# Patient Record
Sex: MALE | Race: WHITE | NOT HISPANIC OR LATINO | Employment: FULL TIME | URBAN - METROPOLITAN AREA
[De-identification: names, ages, dates, MRNs, and addresses within clinical notes are randomized per-mention and may not be internally consistent; named-entity substitution may affect disease eponyms.]

---

## 2019-04-25 ENCOUNTER — HOSPITAL ENCOUNTER (OUTPATIENT)
Dept: GENERAL RADIOLOGY | Facility: HOSPITAL | Age: 54
Discharge: HOME | End: 2019-04-25
Admitting: FAMILY MEDICINE

## 2019-04-25 ENCOUNTER — TRANSCRIBE ORDERS (OUTPATIENT)
Dept: ADMINISTRATIVE | Facility: HOSPITAL | Age: 54
End: 2019-04-25

## 2019-04-25 DIAGNOSIS — M50.90 CERVICAL DISC DISEASE: Primary | ICD-10-CM

## 2019-04-25 PROCEDURE — 72050 X-RAY EXAM NECK SPINE 4/5VWS: CPT

## 2019-04-30 ENCOUNTER — TRANSCRIBE ORDERS (OUTPATIENT)
Dept: ADMINISTRATIVE | Facility: HOSPITAL | Age: 54
End: 2019-04-30

## 2019-04-30 DIAGNOSIS — M50.00 CERVICAL DISC DISEASE WITH MYELOPATHY: Primary | ICD-10-CM

## 2019-05-29 DIAGNOSIS — M50.00 CERVICAL DISC DISEASE WITH MYELOPATHY: ICD-10-CM

## 2019-06-18 ENCOUNTER — OFFICE VISIT (OUTPATIENT)
Dept: NEUROSURGERY | Facility: CLINIC | Age: 54
End: 2019-06-18

## 2019-06-18 VITALS
TEMPERATURE: 98.2 F | SYSTOLIC BLOOD PRESSURE: 130 MMHG | WEIGHT: 209 LBS | DIASTOLIC BLOOD PRESSURE: 78 MMHG | BODY MASS INDEX: 29.26 KG/M2 | HEIGHT: 71 IN

## 2019-06-18 DIAGNOSIS — M47.22 OSTEOARTHRITIS OF SPINE WITH RADICULOPATHY, CERVICAL REGION: Primary | ICD-10-CM

## 2019-06-18 PROCEDURE — 99203 OFFICE O/P NEW LOW 30 MIN: CPT | Performed by: PHYSICIAN ASSISTANT

## 2019-06-18 RX ORDER — DICYCLOMINE HYDROCHLORIDE 10 MG/1
CAPSULE ORAL
COMMUNITY
Start: 2019-05-31 | End: 2019-07-05

## 2019-06-18 NOTE — PROGRESS NOTES
Patient: Yamil Barrios  : 1965  Chart #: [id number]    Date of Service: 2019    CHIEF COMPLAINT: Neck and right arm pain with sensory alteration    History of Present Illness Mr. 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  He describes a 5 year history of pain and some degree of weakness in the right shoulder.  He was reportedly told it was due to his biceps tendon dislocating when his abducts his arm.  He's had intermittent neck pain.  Over the past 5 months or so symptoms have progressed. He has worsening neck pain that extends into the scapula and down the arm to the index finger and thumb.  These fingers are numb all the time.  His arm frequently feels like it is asleep.  He has generalized weakness.  His shoulder hurts more.  Symptoms are worse when leaning forward and with activity. He has been doing range of motion and strengthening exercises at home to no avail.  No left sided symptoms.     Patient had a lumbar discectomy 12 years ago at [facility] and did well.     History reviewed. No pertinent past medical history.      Current Outpatient Medications:   •  dicyclomine (BENTYL) 10 MG capsule, , Disp: , Rfl:     Past Surgical History:   Procedure Laterality Date   • HAND SURGERY Bilateral    • LIPOMA EXCISION     • LUMBAR DISCECTOMY      Dr. Dorman level unknown   • NASAL SEPTUM SURGERY         Social History     Socioeconomic History   • Marital status:      Spouse name: Not on file   • Number of children: Not on file   • Years of education: Not on file   • Highest education level: Not on file   Tobacco Use   • Smoking status: Former Smoker   • Smokeless tobacco: Never Used   Substance and Sexual Activity   • Alcohol use: No     Frequency: Never   • Drug use: No   • Sexual activity: Defer         Review of Systems   Constitutional: Positive for fatigue. Negative for activity change, appetite change, chills, diaphoresis, fever and  unexpected weight change.   HENT: Negative for congestion, dental problem, drooling, ear discharge, ear pain, facial swelling, hearing loss, mouth sores, nosebleeds, postnasal drip, rhinorrhea, sinus pressure, sneezing, sore throat, tinnitus, trouble swallowing and voice change.    Eyes: Positive for itching. Negative for photophobia, pain, discharge, redness and visual disturbance.   Respiratory: Negative for apnea, cough, choking, chest tightness, shortness of breath, wheezing and stridor.    Cardiovascular: Negative for chest pain, palpitations and leg swelling.   Gastrointestinal: Negative for abdominal distention, abdominal pain, anal bleeding, blood in stool, constipation, diarrhea, nausea, rectal pain and vomiting.   Endocrine: Negative for cold intolerance, heat intolerance, polydipsia, polyphagia and polyuria.   Genitourinary: Negative for decreased urine volume, difficulty urinating, dysuria, enuresis, flank pain, frequency, genital sores, hematuria and urgency.   Musculoskeletal: Positive for arthralgias, back pain, myalgias, neck pain and neck stiffness. Negative for gait problem and joint swelling.   Skin: Negative for color change, pallor, rash and wound.   Allergic/Immunologic: Positive for environmental allergies. Negative for food allergies and immunocompromised state.   Neurological: Positive for weakness, numbness and headaches. Negative for dizziness, tremors, seizures, syncope, facial asymmetry, speech difficulty and light-headedness.   Hematological: Negative for adenopathy. Does not bruise/bleed easily.   Psychiatric/Behavioral: Positive for sleep disturbance. Negative for agitation, behavioral problems, confusion, decreased concentration, dysphoric mood, hallucinations, self-injury and suicidal ideas. The patient is not nervous/anxious and is not hyperactive.        Objective   Vital Signs: Blood pressure 130/78, temperature 98.2 °F (36.8 °C), temperature source Temporal, height 180.3 cm  "(71\"), weight 94.8 kg (209 lb).  Physical Exam   Constitutional: He appears well-developed and well-nourished. No distress.   HENT:   Head: Normocephalic and atraumatic.   Eyes: EOM are normal. Pupils are equal, round, and reactive to light.   Cardiovascular: Normal rate and regular rhythm.   Pulmonary/Chest: Effort normal and breath sounds normal.   Psychiatric: He has a normal mood and affect. His behavior is normal. Thought content normal.   Nursing note and vitals reviewed.  Musculoskeletal: No shoulder tenderness. No biceps tendon tenderness.   Strength is intact in upper and lower extremities to direct testing.  Station and gait are normal.  Neurologic:  Muscle tone is normal throughout.  Coordination is intact.  Deep tendon reflexes: 1+ and symmetrical.  Sensation is intact to light touch throughout.  Patient is oriented to person, place, and time.  Yee sign negative.     Independent review of radiographic imaging: PLain films of the cervical spine demonstate normal alignment. There is disc space narrowing particularly at C6-7.     Assessment/Plan    Diagnosis: Neck and right arm pain concerning for C6 radiculopathy     Medical Decision Making: I referred patient for some formal therapy with traction.  He will follow up in about 3 weeks with an MRI of the cervical spine for review and further recommendation.  He is not interested in medicines to treat his symptoms at this time.           Yamil was seen today for neck pain.    Diagnoses and all orders for this visit:    Osteoarthritis of spine with radiculopathy, cervical region  -     MRI Cervical Spine Without Contrast; Future  -     Ambulatory Referral to Physical Therapy (Cervical traction )               Lauren Garvey PA-C  Patient Care Team:  Bryson Perez MD as PCP - General (Family Medicine)  Bryson Perez MD as Referring Physician (Family Medicine)              "

## 2019-06-25 ENCOUNTER — HOSPITAL ENCOUNTER (OUTPATIENT)
Dept: PHYSICAL THERAPY | Facility: HOSPITAL | Age: 54
Setting detail: THERAPIES SERIES
Discharge: HOME | End: 2019-06-25

## 2019-06-25 DIAGNOSIS — M47.22 OSTEOARTHRITIS OF SPINE WITH RADICULOPATHY, CERVICAL REGION: ICD-10-CM

## 2019-06-25 DIAGNOSIS — M54.12 CERVICAL RADICULOPATHY: Primary | ICD-10-CM

## 2019-06-25 PROCEDURE — 97161 PT EVAL LOW COMPLEX 20 MIN: CPT

## 2019-06-25 NOTE — THERAPY EVALUATION
Outpatient Physical Therapy Ortho Initial Evaluation  [facility]     Patient Name: Yamil Barrios  : 1965  MRN: [id number]  Today's Date: 2019      Visit Date: 2019    There is no problem list on file for this patient.       No past medical history on file.     Past Surgical History:   Procedure Laterality Date   • HAND SURGERY Bilateral    • LIPOMA EXCISION     • LUMBAR DISCECTOMY      Dr. Dorman level unknown   • NASAL SEPTUM SURGERY         Visit Dx:     ICD-10-CM ICD-9-CM   1. Cervical radiculopathy M54.12 723.4         Patient History     Row Name 19 1515             History    Chief Complaint  Numbness;Pain;Tinglings  -LF      Type of Pain  Neck pain;Upper Extremity / Arm right  -LF      Date Current Problem(s) Began  -- 4-5 months ago  -      Brief Description of Current Complaint  Patient presents with right neck and UE pain with numbness and tingling into fingers.  Symptoms began 4-5 months ago, and worsened since onset.  He reports constant numbness in 1st and 2nd fingers, starting to occur in 3rd finger.  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  He was treated several years ago for RTC problem.  He states symptoms improved, but feels may be related to current symptoms.  He also complains of pain and tenderness at his elbow.  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  has continued exercises from last episode P.T.  -LF      What clinical tests have you had for this problem?  X-ray MRI scheduled 7-  -LF      Results of Clinical Tests  degenerative changes  -LF         Pain     Pain Location  Neck  -LF      Pain Frequency  Constant/continuous  -LF      Pain Description  Pins and needles;Tingling;Sharp;Radiating  -LF      What Performance Factors Make the Current  Problem(s) WORSE?  leaning head forward, or resting forward when sitting  -LF      What Performance Factors Make the Current Problem(s) BETTER?  sitting up tall, laying flat (but can't sleep this way)  -LF      Pain Comments  prefers to sleep on R side, but forced to sleep on L due to symptoms but does not sleep well.  Sometimes can position on R side with R arm extended under his head  -LF      Is your sleep disturbed?  Yes  -LF      What position do you sleep in?  Left sidelying;Other (comment)  -LF         Fall Risk Assessment    Any falls in the past year:  Unspecified  -LF         Daily Activities    Primary Language  English  -      How does patient learn best?  Listening;Demonstration  -      Teaching needs identified  Home Exercise Program;Management of Condition  -LF      Barriers to learning  None  -LF      Pt Participated in POC and Goals  Yes  -LF        User Key  (r) = Recorded By, (t) = Taken By, (c) = Cosigned By    Initials Name Provider Type     Isha Patterson PT Physical Therapist          PT Ortho     Row Name 06/25/19 7798       Posture/Observations    Alignment Options  Forward head;Rounded shoulders  -LF    Forward Head  Bilateral:;Moderate  -LF    Rounded Shoulders  Bilateral:;Moderate  -LF    Posture/Observations Comments  slouched posture in sitting  -LF       Quarter Clearing    Quarter Clearing  Upper Quarter Clearing  -LF       Sensory Screen for Light Touch- Upper Quarter Clearing    C4 (posterior shoulder)  Bilateral:;Intact  -LF    C5 (lateral upper arm)  Bilateral:;Intact  -LF    C6 (tip of thumb)  Right:;Diminished;Left:;Intact  -LF    C7 (tip of 3rd finger)  Right:;Diminished;Left:;Intact  -LF    C8 (tip of 5th finger)  Bilateral:;Intact  -LF    T1 (medial lower arm)  Bilateral:;Intact  -LF       Myotomal Screen- Upper Quarter Clearing    Shoulder flexion (C5)  Bilateral:;WNL  -LF    Elbow flexion/wrist extension (C6)  Bilateral:;WNL  -LF    Elbow extension/wrist flexion  (C7)  Bilateral:;WNL  -LF    Finger flexion/ (C8)  Bilateral:;WNL  -LF    Finger abduction (T1)  Bilateral:;WNL  -LF       Special Tests/Palpation    Special Tests/Palpation  Cervical/Thoracic;Elbow/Forearm  -LF       Cervical Palpation    Cervical Palpation- Location?  Upper traps;Levator scapula  -LF    Levator Scapula  Right:;Tender;Guarded/taut  -LF    Upper Traps  Right:;Guarded/taut;Tender  -LF       Cervical/Thoracic Special Tests    Spurlings (Foraminal Compression)  Right:;Positive;Left:;Negative  -LF    Cervical Compression (Forarminal Compression vs. Facet Pain)  Right:;Positive  -LF    Cervical Distraction (Foraminal Compression vs. Facet Pain)  Right:;Positive manual distraction in supine inc'd symptoms  -LF       Elbow/Forearm Special Tests    Lateral Epicondyle Test (Tennis Elbow)  Right:;Positive wrist and 3rd finger ext, flex stretch  -LF       Elbow/Forearm Palpation    Elbow/Forearm Palpation?  Yes  -LF    Lateral Epicondyle  Right:;Tender  -LF    Extensor Tendon  Right:;Tender  -LF       General ROM    Head/Neck/Trunk  Neck Extension;Neck Flexion;Neck Lt Lateral Flexion;Neck Rt Lateral Flexion;Neck Lt Rotation;Neck Rt Rotation  -LF    GENERAL ROM COMMENTS  UE ROM is WNL's  -LF       Head/Neck/Trunk    Neck Extension AROM  45  -LF    Neck Flexion AROM  35  -LF    Neck Lt Lateral Flexion AROM  36  -LF    Neck Rt Lateral Flexion AROM  37  -LF    Neck Lt Rotation AROM  80  -LF    Neck Rt Rotation AROM  78  -LF    Head/Neck/Trunk Comments  increased lower c-spine pain with flex; increased RUE radicular symptoms R rotation, scapular pain with R SB  -LF       MMT (Manual Muscle Testing)    General MMT Comments   strength 112lb bilaterally  -LF      User Key  (r) = Recorded By, (t) = Taken By, (c) = Cosigned By    Initials Name Provider Type    Isha Eduardo PT Physical Therapist                      Therapy Education  Education Details: cervical retraction, and cervical retraction w/  extension.  He has been doing blade squeezes and shoulder circles at home, some stretches  Given: HEP  Program: New  How Provided: Verbal, Demonstration, Written  Provided to: Patient  Level of Understanding: Teach back education performed     PT OP Goals     Row Name 06/25/19 1515          PT Short Term Goals    STG Date to Achieve  07/16/19  -LF     STG 1  Patient to report 50% improvement in radicular symptoms.  -LF     STG 1 Progress  New  -LF     STG 2  Patient independent and compliant with HEP for management of symptoms  -LF     STG 2 Progress  New  -LF        Long Term Goals    LTG Date to Achieve  08/06/19  -LF     LTG 1  Patient to report RUE symptoms resolved  -LF     LTG 1 Progress  New  -LF     LTG 2  Patient to report R-sided pain no greater than 2/10 with daily activities  -LF     LTG 2 Progress  New  -LF     LTG 3  NDI score improved by 10%  -LF     LTG 4  Cervical ROM WNL's and without increased symptoms.  -LF     LTG 4 Progress  New  -LF        Time Calculation    PT Goal Re-Cert Due Date  09/23/19  -LF       User Key  (r) = Recorded By, (t) = Taken By, (c) = Cosigned By    Initials Name Provider Type    LF Isha Patterson, PT Physical Therapist          PT Assessment/Plan     Row Name 06/25/19 1515          PT Assessment    Functional Limitations  Limitation in home management;Performance in leisure activities;Performance in self-care ADL;Performance in work activities  -LF     Impairments  Impaired flexibility;Muscle strength;Pain;Posture;Range of motion;Sensation  -LF     Assessment Comments  Patient presents with signs and symptoms consistent with cervical radiculopathy.  Strength intact, but he has numbness and tingling 1-3 digits. He has increased symtoms with flex and right rotation, improved with extension.  Also noted to have decrease symptoms with distraction while sitting, but increased when tested in supine.  He also has signs and symptoms indicative of lateral epicondylitis.  Will  benefit from continued treatment to decrease pain and tightness, and improve ROM to allow for maximal funciton.  -LF     Please refer to paper survey for additional self-reported information  Yes  -LF     Rehab Potential  Good  -LF     Patient/caregiver participated in establishment of treatment plan and goals  Yes  -LF     Patient would benefit from skilled therapy intervention  Yes  -LF        PT Plan    PT Frequency  2x/week  -LF     Predicted Duration of Therapy Intervention (Therapy Eval)  8-12 visits  -LF     Planned CPT's?  PT EVAL LOW COMPLEXITY: 44869;PT RE-EVAL: 21195;PT THER PROC EA 15 MIN: 08116;PT MANUAL THERAPY EA 15 MIN: 25742;PT ELECTRICAL STIM UNATTEND: ;PT ELECTRICAL STIM ATTD EA 15 MIN: 57501;PT ULTRASOUND EA 15 MIN: 73815;PT TRACTION CERVICAL: 37918;PT HOT/COLD PACK WC NONMCARE: 50023;PT IONTOPHORESIS EA 15 MIN: 06639  -LF     PT Plan Comments  cont. per POC with extension based exercises, manual therapy to address muscle tightness, manual vs. mechanical traction.  Also include modalities prn to help with pain  -LF       User Key  (r) = Recorded By, (t) = Taken By, (c) = Cosigned By    Initials Name Provider Type    Isha Eduardo PT Physical Therapist            Outcome Measure Options: Neck Disability Index (NDI)  Neck Disability Index  Section 1 - Pain Intensity: The pain is moderate at the moment.  Section 2 - Personal Care: I can look after myself normally without causing extra pain.  Section 3 - Lifting: I can lift heavy weights, but it gives me extra pain.  Section 4 - Work: I can only do my usual work, but no more  Section 5 - Headaches: I have moderate headaches that come infrequently.  Section 6 - Concentration: I can concentrate fully with slight difficulty.  Section 7 - Sleeping: My sleep is greatly disturbed for up to 3-5 hours.  Section 8 - Driving: I can drive as long as I want with moderate neck pain.  Section 9 - Reading: I can read as much as I want with moderate  neck pain.  Section 10 - Recreation: I have some neck pain with a few recreational activities.  Neck Disability Index Score: 17      Time Calculation:     Start Time: 1515     Therapy Charges for Today     Code Description Service Date Service Provider Modifiers Qty    59319510669 HC PT EVAL LOW COMPLEXITY 4 6/25/2019 Isha Patterson, PT GP 1          PT G-Codes  Outcome Measure Options: Neck Disability Index (NDI)  Neck Disability Index Score: 17         Isha Patterson, PT  6/25/2019

## 2019-07-05 ENCOUNTER — HOSPITAL ENCOUNTER (OUTPATIENT)
Dept: MRI IMAGING | Facility: HOSPITAL | Age: 54
Discharge: HOME | End: 2019-07-05
Admitting: PHYSICIAN ASSISTANT

## 2019-07-05 ENCOUNTER — HOSPITAL ENCOUNTER (OUTPATIENT)
Dept: PHYSICAL THERAPY | Facility: HOSPITAL | Age: 54
Setting detail: THERAPIES SERIES
Discharge: HOME | End: 2019-07-05

## 2019-07-05 ENCOUNTER — OFFICE VISIT (OUTPATIENT)
Dept: NEUROSURGERY | Facility: CLINIC | Age: 54
End: 2019-07-05

## 2019-07-05 VITALS — WEIGHT: 205 LBS | TEMPERATURE: 98.5 F | BODY MASS INDEX: 28.7 KG/M2 | HEIGHT: 71 IN

## 2019-07-05 DIAGNOSIS — M47.22 CERVICAL SPONDYLOSIS WITH RADICULOPATHY: Primary | ICD-10-CM

## 2019-07-05 DIAGNOSIS — M47.22 OSTEOARTHRITIS OF SPINE WITH RADICULOPATHY, CERVICAL REGION: ICD-10-CM

## 2019-07-05 DIAGNOSIS — M50.30 DDD (DEGENERATIVE DISC DISEASE), CERVICAL: ICD-10-CM

## 2019-07-05 PROCEDURE — 97140 MANUAL THERAPY 1/> REGIONS: CPT | Performed by: PHYSICAL THERAPIST

## 2019-07-05 PROCEDURE — 97110 THERAPEUTIC EXERCISES: CPT | Performed by: PHYSICAL THERAPIST

## 2019-07-05 PROCEDURE — 99213 OFFICE O/P EST LOW 20 MIN: CPT | Performed by: NEUROLOGICAL SURGERY

## 2019-07-05 PROCEDURE — 72141 MRI NECK SPINE W/O DYE: CPT

## 2019-07-05 NOTE — THERAPY TREATMENT NOTE
Outpatient Physical Therapy Ortho Treatment Note  [facility]     Patient Name: Yamil Barrios  : 1965  MRN: [id number]  Today's Date: 2019      Visit Date: 2019    Visit Dx:  No diagnosis found.    There is no problem list on file for this patient.       No past medical history on file.     Past Surgical History:   Procedure Laterality Date   • HAND SURGERY Bilateral    • LIPOMA EXCISION     • LUMBAR DISCECTOMY      Dr. Dorman level unknown   • NASAL SEPTUM SURGERY                         PT Assessment/Plan     Row Name 19 1524          PT Assessment    Assessment Comments  Client today demonstrates intermittent response with use of lateral component of repeated motions.  He will trial right roation HEP and follow up re: response.   Response in session today was limited however will continue to address carryover in determining POC  -CR        PT Plan    PT Plan Comments  Follow up on right rotation work re: carryover of relief  -CR       User Key  (r) = Recorded By, (t) = Taken By, (c) = Cosigned By    Initials Name Provider Type    Haris Hinojosa, PT Physical Therapist            Exercises     Row Name 19 1500             Subjective Comments    Subjective Comments  Client reports ongoing numbness in index and thumb.  Client reports work schedule has signficantly limited ability to attend therapy sessions.    -CR         Subjective Pain    Able to rate subjective pain?  yes  -CR      Pre-Treatment Pain Level  2  -CR         Total Minutes    92028 - PT Therapeutic Exercise Minutes  15  -CR      62895 - PT Manual Therapy Minutes  15  -CR         Exercise 1    Exercise Name 1  Seated retractions  -CR      Sets 1  3  -CR      Reps 1  10  -CR         Exercise 2    Exercise Name 2  Seated retraction/extension  -CR      Sets 2  3  -CR      Reps 2  10  -CR         Exercise 3    Exercise Name 3  Right rotation with OP  -CR      Sets 3  3  -CR      Reps 3  10  -CR        User Key   (r) = Recorded By, (t) = Taken By, (c) = Cosigned By    Initials Name Provider Type    Haris Hinojosa PT Physical Therapist                      Manual Rx (last 36 hours)      Manual Treatments     Row Name 07/05/19 1500             Total Minutes    26653 - PT Manual Therapy Minutes  15  -CR         Manual Rx 1    Manual Rx 1 Location  manual traction  -CR         Manual Rx 2    Manual Rx 2 Location  right sidebending  -CR         Manual Rx 3    Manual Rx 3 Location  retraction + erxtnesion  -CR        User Key  (r) = Recorded By, (t) = Taken By, (c) = Cosigned By    Initials Name Provider Type    Haris Hinojosa PT Physical Therapist              Therapy Education  Education Details: discussed right rotation/sidebend use for lateral HEP.                Time Calculation:   Start Time: 1440  Therapy Charges for Today     Code Description Service Date Service Provider Modifiers Qty    49816726097  PT THER PROC EA 15 MIN 7/5/2019 Haris Gonzalez, PT GP 1    [id number]  PT MANUAL THERAPY EA 15 MIN 7/5/2019 Hrais Gonzalez, PT GP 1                    Haris Gonzalez PT  7/5/2019

## 2019-07-05 NOTE — PROGRESS NOTES
Patient: Yamil Barrios  : 1965    Primary Care Provider: Bryson Perez MD    Requesting Provider: As above        History    Chief Complaint: Neck and right arm pain with sensory alteration.    History of Present Illness: Mr. 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  He describes a 5 year history of pain and some degree of weakness in the right shoulder.  He was reportedly told it was due to his biceps tendon dislocating when his abducts his arm.  He's had intermittent neck pain.  Over the past 5 months or so symptoms have progressed. He has worsening neck pain that extends into the scapula and down the arm to the index finger and thumb.  These fingers are numb all the time.  His arm frequently feels like it is asleep.  He has generalized weakness.  His shoulder hurts more.  Symptoms are worse when leaning forward and with activity. He has been doing range of motion and strengthening exercises at home to no avail.  No left sided symptoms.  He has just been evaluated by therapy.  His schedule right now before school resumes is very hectic and entails a great many hours.      Review of Systems   Constitutional: Positive for fatigue. Negative for activity change, appetite change, chills, diaphoresis, fever and unexpected weight change.   HENT: Positive for rhinorrhea, sinus pressure and sinus pain. Negative for congestion, dental problem, drooling, ear discharge, ear pain, facial swelling, hearing loss, mouth sores, nosebleeds, postnasal drip, sneezing, sore throat, tinnitus, trouble swallowing and voice change.    Eyes: Positive for redness and itching. Negative for photophobia, pain, discharge and visual disturbance.   Respiratory: Negative for apnea, cough, choking, chest tightness, shortness of breath, wheezing and stridor.    Cardiovascular: Negative for chest pain, palpitations and leg swelling.   Gastrointestinal: Negative for abdominal  "distention, abdominal pain, anal bleeding, blood in stool, constipation, diarrhea, nausea, rectal pain and vomiting.   Endocrine: Negative for cold intolerance, heat intolerance, polydipsia, polyphagia and polyuria.   Genitourinary: Negative for decreased urine volume, difficulty urinating, discharge, dysuria, enuresis, flank pain, frequency, genital sores, hematuria, penile pain, penile swelling, scrotal swelling, testicular pain and urgency.   Musculoskeletal: Positive for arthralgias, back pain, myalgias, neck pain and neck stiffness. Negative for gait problem and joint swelling.   Skin: Negative for color change, pallor, rash and wound.   Allergic/Immunologic: Positive for environmental allergies. Negative for food allergies and immunocompromised state.   Neurological: Positive for dizziness, weakness and headaches. Negative for tremors, seizures, syncope, facial asymmetry, speech difficulty, light-headedness and numbness.   Hematological: Negative for adenopathy. Does not bruise/bleed easily.   Psychiatric/Behavioral: Negative for agitation, behavioral problems, confusion, decreased concentration, dysphoric mood, hallucinations, self-injury, sleep disturbance and suicidal ideas. The patient is not nervous/anxious and is not hyperactive.        The patient's past medical history, past surgical history, family history, and social history have been reviewed at length in the electronic medical record.    Physical Exam:   Temp 98.5 °F (36.9 °C) (Temporal)   Ht 180.3 cm (71\")   Wt 93 kg (205 lb)   BMI 28.59 kg/m²   MUSCULOSKELETAL:  Neck tenderness to palpation is not observed.   ROM in neck is normal.  Ranging the right shoulder is well-tolerated without significant pain or limitation  NEUROLOGICAL:  Strength is intact in the upper and lower extremities to direct testing.  Muscle tone is normal throughout.  Station and gait are normal.  Sensation is intact to light touch testing throughout.  Deep tendon reflexes " are 1+ and symmetrical.  Cuco's Sign is negative bilaterally.       Medical Decision Making    Data Review:   MRI of the cervical spine demonstrates some disc-osteophyte very prominent rightward at C6-7 and to a lesser extent bilaterally at C5-6.    Diagnosis:   Cervical spondylosis with right upper extremity radiculopathy.    Treatment Options:   I discussed treatment options with the patient including physical therapy and surgical intervention.  I have explained the nature of the surgery as well as the potential risks, complications, and limitations.  He is going to try and get what therapy in that he can given his work schedule.  I prescribed a home traction unit so that at least we can do some home traction before he follows up after school starts.  His schedule should lessen at that point.  If he continues to struggle despite therapy then C5-7 ACDF will be recommended.       Diagnosis Plan   1. Cervical spondylosis with radiculopathy  Ambulatory Referral to Physical Therapy Evaluate and treat, Neuro    Cervical Traction Kit   2. DDD (degenerative disc disease), cervical             I, Dr. Landaverde, personally performed the services described in the documentation, as scribed in my presence, and it is both accurate and complete.  Scribed for Sanjay Landaverde MD by Parth Valdes CMA on 07/05/2019 at 12:27 PM

## 2019-07-09 ENCOUNTER — HOSPITAL ENCOUNTER (OUTPATIENT)
Dept: PHYSICAL THERAPY | Facility: HOSPITAL | Age: 54
Setting detail: THERAPIES SERIES
Discharge: HOME | End: 2019-07-09

## 2019-07-09 DIAGNOSIS — M54.12 CERVICAL RADICULOPATHY: Primary | ICD-10-CM

## 2019-07-09 DIAGNOSIS — M47.22 OSTEOARTHRITIS OF SPINE WITH RADICULOPATHY, CERVICAL REGION: ICD-10-CM

## 2019-07-09 NOTE — THERAPY DISCHARGE NOTE
Outpatient Physical Therapy Discharge Summary  [facility]       Patient Name: Yamil Barrios  : 1965  MRN: [id number]    Today's Date: 2019    Visit Dx:    ICD-10-CM ICD-9-CM   1. Cervical radiculopathy M54.12 723.4   2. Osteoarthritis of spine with radiculopathy, cervical region M47.22 721.0       PT OP Goals     Row Name 19 1535          PT Short Term Goals    STG Date to Achieve  19  -AC     STG 1  Patient to report 50% improvement in radicular symptoms.  -AC     STG 1 Progress  Not Met  -AC     STG 2  Patient independent and compliant with HEP for management of symptoms  -AC     STG 2 Progress  Not Met  -AC        Long Term Goals    LTG Date to Achieve  19  -AC     LTG 1  Patient to report RUE symptoms resolved  -AC     LTG 1 Progress  Not Met  -AC     LTG 2  Patient to report R-sided pain no greater than 2/10 with daily activities  -AC     LTG 2 Progress  Not Met  -AC     LTG 3  NDI score improved by 10%  -AC     LTG 3 Progress  Not Met  -AC     LTG 4  Cervical ROM WNL's and without increased symptoms.  -AC     LTG 4 Progress  Not Met  -AC        Time Calculation    PT Goal Re-Cert Due Date  19  -AC       User Key  (r) = Recorded By, (t) = Taken By, (c) = Cosigned By    Initials Name Provider Type    Shirley Bethea, PT Physical Therapist        OP PT Discharge Summary  Date of Discharge: 19  Reason for Discharge: Unable to participate  Outcomes Achieved: Other  Discharge Destination: Home with home program  Discharge Instructions/Additional Comments: Pt was seen for IE and one follow up to address cervical radiculopathy.  Pt was unable to attend further therapy sessions due to work schedule restrictions, and is currently seeking therapy from another clinic with later work hours. Pt will be discharged from this episode of care at this time.       Time Calculation:   Start Time:     Shirley Bustamante PT  2019

## 2019-07-11 ENCOUNTER — APPOINTMENT (OUTPATIENT)
Dept: PHYSICAL THERAPY | Facility: HOSPITAL | Age: 54
End: 2019-07-11

## 2020-08-25 ENCOUNTER — TRANSCRIBE ORDERS (OUTPATIENT)
Dept: ADMINISTRATIVE | Facility: HOSPITAL | Age: 55
End: 2020-08-25

## 2020-08-25 DIAGNOSIS — N43.40 SPERMATOCELE OF EPIDIDYMIS: ICD-10-CM

## 2020-08-25 DIAGNOSIS — N43.40 SPERMATOCELE: Primary | ICD-10-CM

## 2020-09-16 ENCOUNTER — APPOINTMENT (OUTPATIENT)
Dept: ULTRASOUND IMAGING | Facility: HOSPITAL | Age: 55
End: 2020-09-16

## 2020-09-23 ENCOUNTER — HOSPITAL ENCOUNTER (OUTPATIENT)
Dept: ULTRASOUND IMAGING | Facility: HOSPITAL | Age: 55
Discharge: HOME | End: 2020-09-23
Admitting: FAMILY MEDICINE

## 2020-09-23 DIAGNOSIS — N43.40 SPERMATOCELE OF EPIDIDYMIS: ICD-10-CM

## 2020-09-23 PROCEDURE — 76870 US EXAM SCROTUM: CPT
